# Patient Record
Sex: MALE | Race: OTHER | URBAN - METROPOLITAN AREA
[De-identification: names, ages, dates, MRNs, and addresses within clinical notes are randomized per-mention and may not be internally consistent; named-entity substitution may affect disease eponyms.]

---

## 2023-01-27 ENCOUNTER — EMERGENCY (EMERGENCY)
Facility: HOSPITAL | Age: 23
LOS: 0 days | Discharge: HOME | End: 2023-01-27
Attending: EMERGENCY MEDICINE | Admitting: EMERGENCY MEDICINE
Payer: MEDICAID

## 2023-01-27 VITALS
TEMPERATURE: 98 F | RESPIRATION RATE: 20 BRPM | DIASTOLIC BLOOD PRESSURE: 59 MMHG | HEART RATE: 66 BPM | OXYGEN SATURATION: 98 % | SYSTOLIC BLOOD PRESSURE: 113 MMHG

## 2023-01-27 DIAGNOSIS — K02.9 DENTAL CARIES, UNSPECIFIED: ICD-10-CM

## 2023-01-27 DIAGNOSIS — K08.89 OTHER SPECIFIED DISORDERS OF TEETH AND SUPPORTING STRUCTURES: ICD-10-CM

## 2023-01-27 PROCEDURE — 99284 EMERGENCY DEPT VISIT MOD MDM: CPT

## 2023-01-27 NOTE — ED PROVIDER NOTE - OBJECTIVE STATEMENT
22-year-old male with no significant PMH who presents with left dental pain x1 week.  Left lower and upper.  History of dental caries.  Patient denies any fevers, chills, nausea, vomiting, dysphagia, difficulty breathing, chest pain, neck pain.

## 2023-01-27 NOTE — ED PROVIDER NOTE - PHYSICAL EXAMINATION
CONSTITUTIONAL: in no acute distress, afebrile  SKIN: Warm, dry  HEAD: Normocephalic; atraumatic  EYES: No conjunctival injection. EOMI. PERRLA  ENT: No nasal discharge; oropharynx nonerythematous; airway clear; dental carry to tooth 14 and 19, no gingivae involvement, no bleeding, uvula midline  NECK: Supple; non tender, No JVD  NEURO: A&O x3, grossly unremarkable, no focal deficits  *Chaperone was used during the encounter

## 2023-01-27 NOTE — ED PROVIDER NOTE - CLINICAL SUMMARY MEDICAL DECISION MAKING FREE TEXT BOX
No distress.  VSS.  Non toxic and no signs of sepsis.  Internal transfer to dental for definitive care.

## 2023-01-27 NOTE — CONSULT NOTE ADULT - SUBJECTIVE AND OBJECTIVE BOX
Patient is a 22y old  Male who presents with a chief complaint of pain maxillary and mandibular left side    HPI: Patient reports pain upon percussion, cold and heat sensitivity.       PAST MEDICAL & SURGICAL HISTORY:    ( - ) heart valve replacement  ( - ) joint replacement  ( - ) pregnancy    MEDICATIONS  (STANDING):    MEDICATIONS  (PRN):      Allergies      Intolerances        FAMILY HISTORY:      *SOCIAL HISTORY: (-   ) Tobacco; ( -  ) ETOH    *Last Dental Visit:    Vital Signs Last 24 Hrs  T(C): 36.8 (27 Jan 2023 12:08), Max: 36.8 (27 Jan 2023 12:08)  T(F): 98.3 (27 Jan 2023 12:08), Max: 98.3 (27 Jan 2023 12:08)  HR: 66 (27 Jan 2023 12:08) (66 - 66)  BP: 113/59 (27 Jan 2023 12:08) (113/59 - 113/59)  BP(mean): --  RR: 20 (27 Jan 2023 12:08) (20 - 20)  SpO2: 98% (27 Jan 2023 12:08) (98% - 98%)    Parameters below as of 27 Jan 2023 12:08  Patient On (Oxygen Delivery Method): room air        LABS:                  EOE:  TMJ ( - ) clicks                     ( - ) pops                     ( - ) crepitus             Mandible <<FROM>>             Facial bones and MOM <<grossly intact>>             ( - ) trismus             ( - ) lymphadenopathy             ( - ) swelling             ( - ) asymmetry             ( - ) palpation             ( - ) dyspnea             ( - ) dysphagia             ( - ) loss of consciousness    IOE:  <<permanent>> dentition: <multiple carious teeth>>            hard/soft palate: <<No pathology noted>>           tongue/FOM <<No pathology noted>>           labial/buccal mucosa <<No pathology noted>>           ( + ) percussion           ( - ) palpation           ( - ) swelling            ( - ) abscess           ( - ) sinus tract    Dentition present: <<y>>  Mobility: <<n>>  Caries: << y>>         *DENTAL RADIOGRAPHS: Periapical #14 and #19    RADIOLOGY & ADDITIONAL STUDIES:    *ASSESSMENT: Fractured amalgam #15, Caries #18,19,20,21. Crown #14 needs further evaluation, informed patient to return to outpatient provider that completed crown.     Informed patient that treatment planning would be necessary prior to any intervention occurring. Patient understood and patient dismissed. Patient stated he would schedule an appointment.         RECOMMENDATIONS:  1) Dental F/U with outpatient dentist for comprehensive dental care.   2) If any difficulty swallowing/breathing, fever occur, return to ER.     Resident Name:Anton Gaston, pager #[phone]